# Patient Record
Sex: FEMALE | Race: WHITE | NOT HISPANIC OR LATINO
[De-identification: names, ages, dates, MRNs, and addresses within clinical notes are randomized per-mention and may not be internally consistent; named-entity substitution may affect disease eponyms.]

---

## 2021-04-28 ENCOUNTER — TRANSCRIPTION ENCOUNTER (OUTPATIENT)
Age: 46
End: 2021-04-28

## 2021-04-28 ENCOUNTER — EMERGENCY (EMERGENCY)
Facility: HOSPITAL | Age: 46
LOS: 1 days | Discharge: HOME | End: 2021-04-28
Attending: EMERGENCY MEDICINE | Admitting: EMERGENCY MEDICINE
Payer: COMMERCIAL

## 2021-04-28 VITALS
OXYGEN SATURATION: 99 % | HEART RATE: 68 BPM | RESPIRATION RATE: 216 BRPM | SYSTOLIC BLOOD PRESSURE: 139 MMHG | TEMPERATURE: 98 F | DIASTOLIC BLOOD PRESSURE: 82 MMHG | HEIGHT: 66 IN | WEIGHT: 164.02 LBS

## 2021-04-28 DIAGNOSIS — R19.00 INTRA-ABDOMINAL AND PELVIC SWELLING, MASS AND LUMP, UNSPECIFIED SITE: ICD-10-CM

## 2021-04-28 DIAGNOSIS — Z98.890 OTHER SPECIFIED POSTPROCEDURAL STATES: Chronic | ICD-10-CM

## 2021-04-28 LAB
ALBUMIN SERPL ELPH-MCNC: 4.3 G/DL — SIGNIFICANT CHANGE UP (ref 3.3–5)
ALP SERPL-CCNC: 51 U/L — SIGNIFICANT CHANGE UP (ref 40–120)
ALT FLD-CCNC: 11 U/L — SIGNIFICANT CHANGE UP (ref 10–45)
ANION GAP SERPL CALC-SCNC: 11 MMOL/L — SIGNIFICANT CHANGE UP (ref 5–17)
APPEARANCE UR: CLEAR — SIGNIFICANT CHANGE UP
APTT BLD: 25.5 SEC — LOW (ref 27.5–35.5)
AST SERPL-CCNC: 17 U/L — SIGNIFICANT CHANGE UP (ref 10–40)
BASOPHILS # BLD AUTO: 0.06 K/UL — SIGNIFICANT CHANGE UP (ref 0–0.2)
BASOPHILS NFR BLD AUTO: 0.8 % — SIGNIFICANT CHANGE UP (ref 0–2)
BILIRUB SERPL-MCNC: 0.3 MG/DL — SIGNIFICANT CHANGE UP (ref 0.2–1.2)
BILIRUB UR-MCNC: NEGATIVE — SIGNIFICANT CHANGE UP
BUN SERPL-MCNC: 21 MG/DL — SIGNIFICANT CHANGE UP (ref 7–23)
CALCIUM SERPL-MCNC: 9.1 MG/DL — SIGNIFICANT CHANGE UP (ref 8.4–10.5)
CHLORIDE SERPL-SCNC: 102 MMOL/L — SIGNIFICANT CHANGE UP (ref 96–108)
CO2 SERPL-SCNC: 26 MMOL/L — SIGNIFICANT CHANGE UP (ref 22–31)
COLOR SPEC: YELLOW — SIGNIFICANT CHANGE UP
CREAT SERPL-MCNC: 0.74 MG/DL — SIGNIFICANT CHANGE UP (ref 0.5–1.3)
DIFF PNL FLD: ABNORMAL
EOSINOPHIL # BLD AUTO: 0.32 K/UL — SIGNIFICANT CHANGE UP (ref 0–0.5)
EOSINOPHIL NFR BLD AUTO: 4.3 % — SIGNIFICANT CHANGE UP (ref 0–6)
GLUCOSE SERPL-MCNC: 99 MG/DL — SIGNIFICANT CHANGE UP (ref 70–99)
GLUCOSE UR QL: NEGATIVE — SIGNIFICANT CHANGE UP
HCG SERPL-ACNC: <0 MIU/ML — SIGNIFICANT CHANGE UP
HCT VFR BLD CALC: 38.1 % — SIGNIFICANT CHANGE UP (ref 34.5–45)
HGB BLD-MCNC: 13 G/DL — SIGNIFICANT CHANGE UP (ref 11.5–15.5)
IMM GRANULOCYTES NFR BLD AUTO: 0.5 % — SIGNIFICANT CHANGE UP (ref 0–1.5)
INR BLD: 0.92 — SIGNIFICANT CHANGE UP (ref 0.88–1.16)
KETONES UR-MCNC: NEGATIVE — SIGNIFICANT CHANGE UP
LEUKOCYTE ESTERASE UR-ACNC: ABNORMAL
LYMPHOCYTES # BLD AUTO: 2.89 K/UL — SIGNIFICANT CHANGE UP (ref 1–3.3)
LYMPHOCYTES # BLD AUTO: 38.6 % — SIGNIFICANT CHANGE UP (ref 13–44)
MCHC RBC-ENTMCNC: 31.3 PG — SIGNIFICANT CHANGE UP (ref 27–34)
MCHC RBC-ENTMCNC: 34.1 GM/DL — SIGNIFICANT CHANGE UP (ref 32–36)
MCV RBC AUTO: 91.8 FL — SIGNIFICANT CHANGE UP (ref 80–100)
MONOCYTES # BLD AUTO: 0.47 K/UL — SIGNIFICANT CHANGE UP (ref 0–0.9)
MONOCYTES NFR BLD AUTO: 6.3 % — SIGNIFICANT CHANGE UP (ref 2–14)
NEUTROPHILS # BLD AUTO: 3.71 K/UL — SIGNIFICANT CHANGE UP (ref 1.8–7.4)
NEUTROPHILS NFR BLD AUTO: 49.5 % — SIGNIFICANT CHANGE UP (ref 43–77)
NITRITE UR-MCNC: NEGATIVE — SIGNIFICANT CHANGE UP
NRBC # BLD: 0 /100 WBCS — SIGNIFICANT CHANGE UP (ref 0–0)
PH UR: 6 — SIGNIFICANT CHANGE UP (ref 5–8)
PLATELET # BLD AUTO: 307 K/UL — SIGNIFICANT CHANGE UP (ref 150–400)
POTASSIUM SERPL-MCNC: 4.2 MMOL/L — SIGNIFICANT CHANGE UP (ref 3.5–5.3)
POTASSIUM SERPL-SCNC: 4.2 MMOL/L — SIGNIFICANT CHANGE UP (ref 3.5–5.3)
PROT SERPL-MCNC: 7.3 G/DL — SIGNIFICANT CHANGE UP (ref 6–8.3)
PROT UR-MCNC: NEGATIVE MG/DL — SIGNIFICANT CHANGE UP
PROTHROM AB SERPL-ACNC: 11.1 SEC — SIGNIFICANT CHANGE UP (ref 10.6–13.6)
RBC # BLD: 4.15 M/UL — SIGNIFICANT CHANGE UP (ref 3.8–5.2)
RBC # FLD: 11.9 % — SIGNIFICANT CHANGE UP (ref 10.3–14.5)
SODIUM SERPL-SCNC: 139 MMOL/L — SIGNIFICANT CHANGE UP (ref 135–145)
SP GR SPEC: 1.02 — SIGNIFICANT CHANGE UP (ref 1–1.03)
UROBILINOGEN FLD QL: 0.2 E.U./DL — SIGNIFICANT CHANGE UP
WBC # BLD: 7.49 K/UL — SIGNIFICANT CHANGE UP (ref 3.8–10.5)
WBC # FLD AUTO: 7.49 K/UL — SIGNIFICANT CHANGE UP (ref 3.8–10.5)

## 2021-04-28 PROCEDURE — 99285 EMERGENCY DEPT VISIT HI MDM: CPT

## 2021-04-28 NOTE — ED PROVIDER NOTE - PHYSICAL EXAMINATION
GEN: Well appearing, well nourished, awake, alert, oriented to person, place, time/situation and in no apparent distress. NTAF  ENT: Airway patent, Nasal mucosa clear. Mouth with normal mucosa.  EYES: Clear bilaterally. PERRL, EOMI  RESPIRATORY: Breathing comfortably with normal RR. No W/C/R, no hypoxia or resp distress.  CARDIAC: Regular rate and rhythm, no M/R/G  ABDOMEN: Soft, nontender, +bowel sounds, no rebound, rigidity, or guarding.   MSK: Range of motion is not limited, no deformities noted.  NEURO: Alert and oriented, no focal deficits.  SKIN: Skin normal color for race, warm, dry and intact. No evidence of rash. Incision site well-healing, no cellulitis, crepitus, bleeding, drainage, gangrene or fluctuance,   PSYCH: Alert and oriented to person, place, time/situation. normal mood and affect. no apparent risk to self or others.

## 2021-04-28 NOTE — ED ADULT NURSE NOTE - OBJECTIVE STATEMENT
pt s/p abdominoplasty 3 weeks ago , c/o lower abdominal swelling x last 3 days , referred in by PCD for ultrasound , denies fever/chills /nausea/vomiting ,pt is urinating and having normal BM

## 2021-04-28 NOTE — ED ADULT NURSE NOTE - CAS EDN DISCHARGE ASSESSMENT
Pt stated she \"thought by breaking Metformin tablets in half she could get more accurate dosing that way & it would just work better.\"  Writer has advised her not to break tabs & Pt verbalized understanding.      Please advise if order should be 500 mg/day or 1000 as Pt is requesting to take only 500 mg.        Alert and oriented to person, place and time

## 2021-04-28 NOTE — ED PROVIDER NOTE - OBJECTIVE STATEMENT
45F with a h/o abdominoplasty with pubic lift (for skin removal after weight loss) by a specialist plastic surgeon in [place] 2 weeks ago who p/w lower abdominal swelling and tightness around the incision x 3 days. She spoke with her doctor in [place] who told her she might have a seroma and recommended the patient get an ultrasound so she came to the ER. The patient denies fever or redness to the area, no bleeding or drainage, no CP/SOB, no other complaints at this time. 45F with a h/o abdominoplasty with pubic lift (for skin removal after weight loss) by a specialist plastic surgeon in [place] 3 weeks ago who p/w lower abdominal swelling and tightness around the incision x 3 days. She spoke with her doctor in [place] who told her she might have a seroma and recommended the patient get an ultrasound so she came to the ER. The patient denies fever or redness to the area, no bleeding or drainage, no CP/SOB, no other complaints at this time.

## 2021-04-28 NOTE — ED PROVIDER NOTE - PROVIDER TOKENS
PROVIDER:[TOKEN:[[id number]]],PROVIDER:[TOKEN:[[id number]]],PROVIDER:[TOKEN:[[id number]]],PROVIDER:[TOKEN:[[id number]]]

## 2021-04-28 NOTE — ED PROVIDER NOTE - CARE PROVIDER_API CALL
Joel Lacy)  Plastic Surgery; Surgery of the Hand  [address], [address]  [place] [address]  Phone: [phone]  Fax: [phone]  Follow Up Time:     Pedro Navarro)  Plastic Surgery  [address], [address]  [place] [address]  Phone: [phone]  Fax: [phone]  Follow Up Time:     Vinay De Anda  PLASTIC SURGERY  [address] [place] [address]  Phone: [phone]  Fax: [phone]  Follow Up Time:     Valdez Riley)  Plastic Surgery; Surgery  [address] [place] [address]  Phone: [phone]  Fax: [phone]  Follow Up Time:

## 2021-04-28 NOTE — ED ADULT TRIAGE NOTE - CHIEF COMPLAINT QUOTE
swelling/ distended lower abdomen for 3 days. s/p abdominoplasty 3 weeks ago. Pt denies fever ,no drainage/bleeding on incision site.

## 2021-04-28 NOTE — ED PROVIDER NOTE - PATIENT PORTAL LINK FT
You can access the FollowMyHealth Patient Portal offered by [facility] by registering at the following website: [email or web address]. By joining [facility]’s FollowMyHealth portal, you will also be able to view your health information using other applications (apps) compatible with our system.

## 2021-04-28 NOTE — ED PROVIDER NOTE - NSFOLLOWUPINSTRUCTIONS_ED_ALL_ED_FT
Follow up with plastic surgeon - call this morning to schedule.  Tylenol for pain, as directed.  Return to the Emergency Department if you have any new or worsening symptoms, or if you have any concerns.  =========================    Seroma    WHAT YOU NEED TO KNOW:    A seroma is a pocket of clear fluid that develops after surgery or an injury. The fluid can collect in tissues or under the skin. Breast, neck, and abdominal surgery are the most common causes of a seroma. A drain used after surgery can also lead to a seroma if it fails or is removed too early. A major surgery or a surgery used to remove tissue increases your risk for a seroma.    DISCHARGE INSTRUCTIONS:    Seek care immediately if:   •You see pus, watery blood, or fluid coming from your surgery site.      •Your surgery wound comes open.      •You have a high fever.      •You have severe pain, redness, warmth, or swelling in the surgery site.      •Your heart is beating faster than usual.      •You have a lump near the surgery site, or the area is tender.      Contact your healthcare provider if:   •You have a mild fever that does not come down with medicine.      •You have questions or concerns about your condition or care.      Medicines: You may need any of the following:   •Antibiotics help prevent or treat a bacterial infection.      •NSAIDs, such as ibuprofen, help decrease swelling, pain, and fever. This medicine is available with or without a doctor's order. NSAIDs can cause stomach bleeding or kidney problems in certain people. If you take blood thinner medicine, always ask if NSAIDs are safe for you. Always read the medicine label and follow directions. Do not give these medicines to children under 6 months of age without direction from your child's healthcare provider.      •Take your medicine as directed. Contact your healthcare provider if you think your medicine is not helping or if you have side effects. Tell him or her if you are allergic to any medicine. Keep a list of the medicines, vitamins, and herbs you take. Include the amounts, and when and why you take them. Bring the list or the pill bottles to follow-up visits. Carry your medicine list with you in case of an emergency.      Manage a seroma:   •Check your surgery site for signs of infection. These include swelling, red skin, or pus. Infection may mean that the seroma is developing into an abscess (pocket of pus). You may need surgery to treat an abscess.      •Follow your healthcare provider's activity instructions. Your healthcare provider will tell you if it is safe for you to exercise and do your daily activities while you have a seroma. He or she will tell you which activities are right for you and how much activity to have each day. You may also need to limit certain movements if your seroma needs to be drained.      •Wear pressure devices, if directed. Pressure devices include pressure bandages and binders. Your healthcare provider will tell you which device to use and how long to wear it each day.      Follow up with your healthcare provider as directed: Write down your questions so you remember to ask them during your visits.

## 2021-04-28 NOTE — ED PROVIDER NOTE - CLINICAL SUMMARY MEDICAL DECISION MAKING FREE TEXT BOX
45F with a h/o abdominoplasty with pubic lift (for skin removal after weight loss) by a specialist plastic surgeon in [place] 2 weeks ago who p/w lower abdominal swelling and tightness around the incision x 3 days. She spoke with her doctor in [place] who told her she might have a seroma and recommended the patient get an ultrasound so she came to the ER. The patient denies fever or redness to the area, no bleeding or drainage, no CP/SOB, no other complaints at this time.  Pt well-appearing on exam with stable VS, afebrile, mild swelling to lower abd noted around incision, likely seroma vs STS, not c/w cellulitis or abscess. Plan for labs and US. Pt states if she has a seroma she will f/u with a plastic surgery specialist for drainage.   Dispo pending workup. 45F with a h/o abdominoplasty with pubic lift (for skin removal after weight loss) by a specialist plastic surgeon in [place] 3 weeks ago who p/w lower abdominal swelling and tightness around the incision x 3 days. She spoke with her doctor in [place] who told her she might have a seroma and recommended the patient get an ultrasound so she came to the ER. The patient denies fever or redness to the area, no bleeding or drainage, no CP/SOB, no other complaints at this time.  Pt well-appearing on exam with stable VS, afebrile, mild swelling to lower abd noted around incision, likely seroma vs STS, not c/w cellulitis or abscess. Plan for labs and US. Pt states if she has a seroma she will f/u with a plastic surgery specialist for drainage.   Dispo pending workup.

## 2021-04-29 ENCOUNTER — EMERGENCY (EMERGENCY)
Facility: HOSPITAL | Age: 46
LOS: 1 days | Discharge: HOME | End: 2021-04-29
Attending: EMERGENCY MEDICINE | Admitting: EMERGENCY MEDICINE
Payer: COMMERCIAL

## 2021-04-29 VITALS
DIASTOLIC BLOOD PRESSURE: 78 MMHG | TEMPERATURE: 99 F | HEIGHT: 66 IN | OXYGEN SATURATION: 98 % | HEART RATE: 80 BPM | WEIGHT: 164.02 LBS | RESPIRATION RATE: 18 BRPM | SYSTOLIC BLOOD PRESSURE: 117 MMHG

## 2021-04-29 VITALS
HEART RATE: 65 BPM | RESPIRATION RATE: 18 BRPM | SYSTOLIC BLOOD PRESSURE: 122 MMHG | OXYGEN SATURATION: 100 % | TEMPERATURE: 98 F | DIASTOLIC BLOOD PRESSURE: 75 MMHG

## 2021-04-29 DIAGNOSIS — L76.33 POSTPROCEDURAL SEROMA OF SKIN AND SUBCUTANEOUS TISSUE FOLLOWING A DERMATOLOGIC PROCEDURE: ICD-10-CM

## 2021-04-29 DIAGNOSIS — Z98.890 OTHER SPECIFIED POSTPROCEDURAL STATES: Chronic | ICD-10-CM

## 2021-04-29 LAB
GRAM STN FLD: SIGNIFICANT CHANGE UP
GRAM STN FLD: SIGNIFICANT CHANGE UP
SPECIMEN SOURCE: SIGNIFICANT CHANGE UP
SPECIMEN SOURCE: SIGNIFICANT CHANGE UP

## 2021-04-29 PROCEDURE — 81001 URINALYSIS AUTO W/SCOPE: CPT

## 2021-04-29 PROCEDURE — 85025 COMPLETE CBC W/AUTO DIFF WBC: CPT

## 2021-04-29 PROCEDURE — 76705 ECHO EXAM OF ABDOMEN: CPT

## 2021-04-29 PROCEDURE — 85610 PROTHROMBIN TIME: CPT

## 2021-04-29 PROCEDURE — 87070 CULTURE OTHR SPECIMN AEROBIC: CPT

## 2021-04-29 PROCEDURE — 99284 EMERGENCY DEPT VISIT MOD MDM: CPT | Mod: 25

## 2021-04-29 PROCEDURE — 84702 CHORIONIC GONADOTROPIN TEST: CPT

## 2021-04-29 PROCEDURE — 87086 URINE CULTURE/COLONY COUNT: CPT

## 2021-04-29 PROCEDURE — 36000 PLACE NEEDLE IN VEIN: CPT

## 2021-04-29 PROCEDURE — 76705 ECHO EXAM OF ABDOMEN: CPT | Mod: 26

## 2021-04-29 PROCEDURE — 87075 CULTR BACTERIA EXCEPT BLOOD: CPT

## 2021-04-29 PROCEDURE — 80053 COMPREHEN METABOLIC PANEL: CPT

## 2021-04-29 PROCEDURE — 10140 I&D HMTMA SEROMA/FLUID COLLJ: CPT

## 2021-04-29 PROCEDURE — 99284 EMERGENCY DEPT VISIT MOD MDM: CPT

## 2021-04-29 PROCEDURE — 85730 THROMBOPLASTIN TIME PARTIAL: CPT

## 2021-04-29 PROCEDURE — 36415 COLL VENOUS BLD VENIPUNCTURE: CPT

## 2021-04-29 RX ORDER — CEPHALEXIN 500 MG
1 CAPSULE ORAL
Qty: 4 | Refills: 0
Start: 2021-04-29 | End: 2021-05-05

## 2021-04-29 RX ORDER — ACETAMINOPHEN 500 MG
1000 TABLET ORAL ONCE
Refills: 0 | Status: COMPLETED | OUTPATIENT
Start: 2021-04-29 | End: 2021-04-29

## 2021-04-29 RX ADMIN — Medication 1000 MILLIGRAM(S): at 02:24

## 2021-04-29 NOTE — ED ADULT NURSE NOTE - OBJECTIVE STATEMENT
pt received into proc room A&Ox3 ambulatory appears comfortable arrives for seroma after surgery md nassar arrives to meet pt

## 2021-04-29 NOTE — ED PROVIDER NOTE - CLINICAL SUMMARY MEDICAL DECISION MAKING FREE TEXT BOX
46 yo female s/p recent abdominoplasty with pubic lift (for skin removal after weight loss) present to ER to see a plastic surgeon.  Pt was diagnosed with post-surgical seroma few days ago. Pt denies any fever, chills, n/v, diarrhea, constipation, or signs of infection at the incision site. Seroma was drained by  while in the ER. pt will f/u  in a few days for a wound check.

## 2021-04-29 NOTE — ED PROVIDER NOTE - NSFOLLOWUPINSTRUCTIONS_ED_ALL_ED_FT
SEROMA - Discharge Care           Seroma    WHAT YOU NEED TO KNOW:    A seroma is a pocket of clear fluid that develops after surgery or an injury. The fluid can collect in tissues or under the skin. Breast, neck, and abdominal surgery are the most common causes of a seroma. A drain used after surgery can also lead to a seroma if it fails or is removed too early. A major surgery or a surgery used to remove tissue increases your risk for a seroma.    DISCHARGE INSTRUCTIONS:    Seek care immediately if:   •You see pus, watery blood, or fluid coming from your surgery site.      •Your surgery wound comes open.      •You have a high fever.      •You have severe pain, redness, warmth, or swelling in the surgery site.      •Your heart is beating faster than usual.      •You have a lump near the surgery site, or the area is tender.      Contact your healthcare provider if:   •You have a mild fever that does not come down with medicine.      •You have questions or concerns about your condition or care.      Medicines: You may need any of the following:   •Antibiotics help prevent or treat a bacterial infection.      •NSAIDs, such as ibuprofen, help decrease swelling, pain, and fever. This medicine is available with or without a doctor's order. NSAIDs can cause stomach bleeding or kidney problems in certain people. If you take blood thinner medicine, always ask if NSAIDs are safe for you. Always read the medicine label and follow directions. Do not give these medicines to children under 6 months of age without direction from your child's healthcare provider.      •Take your medicine as directed. Contact your healthcare provider if you think your medicine is not helping or if you have side effects. Tell him or her if you are allergic to any medicine. Keep a list of the medicines, vitamins, and herbs you take. Include the amounts, and when and why you take them. Bring the list or the pill bottles to follow-up visits. Carry your medicine list with you in case of an emergency.      Manage a seroma:   •Check your surgery site for signs of infection. These include swelling, red skin, or pus. Infection may mean that the seroma is developing into an abscess (pocket of pus). You may need surgery to treat an abscess.      •Follow your healthcare provider's activity instructions. Your healthcare provider will tell you if it is safe for you to exercise and do your daily activities while you have a seroma. He or she will tell you which activities are right for you and how much activity to have each day. You may also need to limit certain movements if your seroma needs to be drained.      •Wear pressure devices, if directed. Pressure devices include pressure bandages and binders. Your healthcare provider will tell you which device to use and how long to wear it each day.      Follow up with your healthcare provider as directed: Write down your questions so you remember to ask them during your visits.

## 2021-04-29 NOTE — ED PROVIDER NOTE - PATIENT PORTAL LINK FT
You can access the FollowMyHealth Patient Portal offered by [facility] by registering at the following website: [email or web address]. By joining [employer] [facility]’s FollowMyHealth portal, you will also be able to view your health information using other applications (apps) compatible with our system.

## 2021-04-29 NOTE — ED ADULT NURSE NOTE - NS_SISCREENINGSR_GEN_ALL_ED
03/10/21 0900   RN Monitoring of Pain, Safety, and Relapse   Safety Concerns Suicidal ideation   Types of Safety Concerns 10/10 SI   Physical Concerns none   Pain Concerns none   Use of Street Drugs or Alcohol No   Taking Medications as Prescribed Yes   Patient Response Appropriate feedback;Attentive;Good eye contact;Interactive;Verbal   Nursing Comments Admission. See progress note.          This visit was performed via live interactive two-way video.    Clinician Location: [facility]   Patient Location: Home      Patient arriving to virtual [facility] program for admission. Patient has been oriented to the program, provided handouts via e-mail, and provided an explanation of expectations for PHP. Writer explained need for abstinence from mood/mind altering substances while in PHP. Patient verbalized understanding and agreement with the above information. Patient has been educated about the safety measures and effects of heat while on medications. Provided with mental health resources information. Patient verbalizes understanding of this information.    Patient affect is flat. Patient stated she is at program for \"anxiety, depression, had ADHD since young, was on Adderall in past, wasn't eating and anxiety worse.\" Patient stated medical conditions are none, see chart. Patient denies any pain at this time. Rated pain a 0, with a goal of 0. Patient stated sleep is, \"spring wacky, wake in middle of night now, wake super early, usually don't do that.\" Stated averages 7 hours/night. Patient reports difficulty falling asleep. Patient reported difficulty with staying asleep. Provided with sleep hygiene handout in admission e-mail. Discussed guided videos for relaxation/sleep. Patient stated appetite is \"not good, don't feel hungry, nothing sounds good.\" Stated she eats one meal/day and \"not usually\" snacks. Encouraged healthy eating, option on meal replacement shakes for vitamins/nutrients. Stated \"have had  some of those.\" Patient stated she drinks enough water daily. Recommended to drink at least 64 ounces daily. Denies JOSE, UHO, AVH. Stated JOSE, \"no,\" not in past either. Stated SI, \"sometimes, about once a day, lasts sometimes all day.\" Stated plan, \"sometimes.\" Patient declined to share more. Stated intent, \"sometimes.\" Patient declined to share more. Denies current specific plan or intent to act on thoughts. Denies any previous SA's. Stated protective factors are \"Family, people around me.\" Reviewed safety plan with patient. Patient agrees to seek out staff if thoughts become unmanageable and prior to acting on impulse to harm self or others. Encouraged to call someone, 911, or go to nearest ER, if unable to control thoughts. Stated [facility] is open 24/7 for assessment for inpatient treatment. Patient verbalized understanding. Stated concentration and focus are \"not good.\" Stated memory is, \"not the best.\" Stated support system is \"mom, whole family, 3 close friends.\" Stated she is medication compliant, \"not prescribed any, only hydroxyzine, don't really take, only have it for panic attacks.\" Allergies reviewed/updated in Epic. Denied current alcohol/drug use. Stated alcohol use, \"was for a long time, trying to escape feeling. Drank quite a bit, always drank.\" Stated last drank 01/01/2021. Stated drug use, \"was smoking pot, since end of sophomore year till February, everyday.\" Stated last use in 02/2021. Denies any questions or concerns at this time. Patient pleasant and verbal but vague. Support provided during admission, and to be continued with treatment.   Vitals deferred due to Virtual PHP platform, for Covid-19 accommodations.           Nursing Suicide Assessment Note - PHP    Current assessment:  See above    Current C-SSRS score: High Risk     Protective Factors / Reason for Living: Social supports(\"Family, people around me\")    Interventions:    -   Maintain current plan of care    -   Safety plan reviewed       -- Patient agrees to seek out staff if thoughts become unmanageable and prior to acting on impulse to harm self or others. Encouraged to call someone, 911, or go to nearest ER, if unable to control thoughts. Stated [facility] is open 24/7 for assessment for inpatient treatment.     Negative

## 2021-04-29 NOTE — ED PROVIDER NOTE - OBJECTIVE STATEMENT
45F with a h/o abdominoplasty with pubic lift (for skin removal after weight loss) by a specialist plastic surgeon in [place] 3 weeks ago recently diagnosed with post surgical seroma. The patient denies fever or redness to the area, no bleeding or drainage, no CP/SOB, no other complaints at this time. pt is in the ER to see Dr. Navarro for seroma drainage.

## 2021-04-30 LAB
CULTURE RESULTS: SIGNIFICANT CHANGE UP
SPECIMEN SOURCE: SIGNIFICANT CHANGE UP

## 2021-05-05 LAB
CULTURE RESULTS: NO GROWTH — SIGNIFICANT CHANGE UP
CULTURE RESULTS: NO GROWTH — SIGNIFICANT CHANGE UP
SPECIMEN SOURCE: SIGNIFICANT CHANGE UP
SPECIMEN SOURCE: SIGNIFICANT CHANGE UP

## 2021-07-05 NOTE — ED ADULT TRIAGE NOTE - TEMPERATURE IN CELSIUS (DEGREES C)
SHIFT SUMMARY:
 
NO ACUTE CHANGES TODAY. PT CONTINUES A&O, TRANSITIONED TO RA AND MAINTAINING
O2 SATS >92%, SR ON MONITOR. PT CONTINUES INCONTINENT OF LOOSE STOOLS, BUT
BUT AMOUNT OF OUTPUT IS DECREASING. PT EVAL COMPLETED TODAY, PT ABLE TO GET UP
OOB USING FWW. PT TOLERATING RENAL DIET WELL, TAKES MEDICATIONS WITHOUT
DIFFICULTY.
PT STATUS CHANGED TO MEDICAL, HAS RECEIVED ROOM ASSIGNMENT, AWAITING RECEIVING
RN FOR REPORT. 36.9

## 2023-03-27 ENCOUNTER — NON-APPOINTMENT (OUTPATIENT)
Age: 48
End: 2023-03-27

## 2023-03-27 PROBLEM — Z00.00 ENCOUNTER FOR PREVENTIVE HEALTH EXAMINATION: Status: ACTIVE | Noted: 2023-03-27

## 2023-03-28 ENCOUNTER — APPOINTMENT (OUTPATIENT)
Dept: ORTHOPEDIC SURGERY | Facility: CLINIC | Age: 48
End: 2023-03-28
Payer: COMMERCIAL

## 2023-03-28 ENCOUNTER — NON-APPOINTMENT (OUTPATIENT)
Age: 48
End: 2023-03-28

## 2023-03-28 VITALS — BODY MASS INDEX: 29.66 KG/M2 | WEIGHT: 189 LBS | HEIGHT: 67 IN

## 2023-03-28 DIAGNOSIS — M23.91 UNSPECIFIED INTERNAL DERANGEMENT OF RIGHT KNEE: ICD-10-CM

## 2023-03-28 DIAGNOSIS — S80.01XA CONTUSION OF RIGHT KNEE, INITIAL ENCOUNTER: ICD-10-CM

## 2023-03-28 PROCEDURE — 99203 OFFICE O/P NEW LOW 30 MIN: CPT

## 2023-03-28 PROCEDURE — 73562 X-RAY EXAM OF KNEE 3: CPT | Mod: RT

## 2023-04-03 NOTE — HISTORY OF PRESENT ILLNESS
[de-identified] : location: right knee \par duration: 11 days\par context: intermittent; tripped and fell onto knee\par quality: dull pain and stiffness\par aggravating factors: twisting knee inward; flexion \par associated sx: initial swelling and bruising\par conservative tx: ice, rest, ibuprofen\par prior studies: N/A

## 2023-04-03 NOTE — PHYSICAL EXAM
[de-identified] : RIGHT knee\par \par Constitutional: \par The patient is healthy-appearing and in no apparent distress. \par \par Gait:\par The patient ambulates with a normal gait and no limp.\par \par Cardiovascular System: \par The capillary refill is less than 2 seconds. \par \par Skin: \par There are no skin abnormalities.\par \par RIGHT Knee:\par  \par Bony Palpation: \par There is no tenderness of the medial joint line. \par There is no tenderness of the lateral joint line.\par There is no tenderness of the medial femoral chondyle.\par There is no tenderness of the lateral femoral chondyle.\par There is no tenderness of the tibial tubercle.\par There is no tenderness of the superior patella.\par There is no tenderness of the inferior patella.\par There is tenderness of the medial patellar facet.\par There is tenderness of the lateral patellar facet.\par \par Soft Tissue Palpation: \par There is no tenderness of the medial retinaculum.\par There is no tenderness of the lateral retinaculum.\par There is no tenderness of the quadriceps tendon.\par There is no tenderness of the patella tendon.\par There is no tenderness of the ITB.\par There is no tenderness of the pes anserine.\par \par Active Range of Motion: \par The range of motion at the knee actively and passively is full. \par \par Special Tests: \par There is a negative Apley.\par There is a negative Steinmanns. \par There is a POSITIVE Lachman and Anterior Drawer.\par There is a negative Posterior Drawer.  \par There is no varus or valgus laxity.\par \par Strength: \par There is 5/5 hip flexion and 5/5 knee flexion and extension.  \par \par Psychiatric: \par The patient demonstrates a normal mood and affect and is active and alert\par \par

## 2023-04-24 ENCOUNTER — APPOINTMENT (OUTPATIENT)
Dept: ORTHOPEDIC SURGERY | Facility: CLINIC | Age: 48
End: 2023-04-24
Payer: COMMERCIAL

## 2023-04-24 PROCEDURE — 99213 OFFICE O/P EST LOW 20 MIN: CPT

## 2023-04-25 NOTE — PHYSICAL EXAM
[de-identified] : RIGHT knee\par \par Constitutional: \par The patient is healthy-appearing and in no apparent distress. \par \par Gait:\par The patient ambulates with a normal gait and no limp.\par \par Cardiovascular System: \par The capillary refill is less than 2 seconds. \par \par Skin: \par There are no skin abnormalities.\par \par RIGHT Knee:\par  \par Bony Palpation: \par There is no tenderness of the medial joint line. \par There is no tenderness of the lateral joint line.\par There is no tenderness of the medial femoral chondyle.\par There is no tenderness of the lateral femoral chondyle.\par There is no tenderness of the tibial tubercle.\par There is no tenderness of the superior patella.\par There is no tenderness of the inferior patella.\par There is tenderness of the medial patellar facet.\par There is tenderness of the lateral patellar facet.\par \par Soft Tissue Palpation: \par There is no tenderness of the medial retinaculum.\par There is no tenderness of the lateral retinaculum.\par There is no tenderness of the quadriceps tendon.\par There is no tenderness of the patella tendon.\par There is no tenderness of the ITB.\par There is no tenderness of the pes anserine.\par \par Active Range of Motion: \par The range of motion at the knee actively and passively is full. \par \par Special Tests: \par There is a negative Apley.\par There is a negative Steinmanns. \par There is a negative Lachman and Anterior Drawer.\par There is a 3+  Posterior Drawer.  \par There is no varus or valgus laxity.\par \par Strength: \par There is 5/5 hip flexion and 5/5 knee flexion and extension.  \par \par Psychiatric: \par The patient demonstrates a normal mood and affect and is active and alert\par \par  [de-identified] : MRI right knee ([facility]):  There is a grade 3 complete tear the PCL as well as mild-to-moderate patellofemoral arthritis

## 2023-04-25 NOTE — HISTORY OF PRESENT ILLNESS
[de-identified] : Patient is an established patient presented for followup evaluation in regards to her right knee discussion regarding her MRI.  She states persistent discomfort with intermittent instability sensation

## 2023-04-25 NOTE — ASSESSMENT
[FreeTextEntry1] : Discussed with patient at length symptoms and diagnosis.  Lengthy discussion with patient regarding nonoperative and operative risks and benefits as well as surgical expectations and postop protocols and expectations, including the possibility that surgery may fail to satisfactorily resolve patient's condition / symptoms.  Patient expresses understanding and patient's questions were answered.  Patient elects to proceed with surgery.\par

## 2023-04-26 ENCOUNTER — TRANSCRIPTION ENCOUNTER (OUTPATIENT)
Age: 48
End: 2023-04-26

## 2023-05-30 RX ORDER — OXYCODONE AND ACETAMINOPHEN 5; 325 MG/1; MG/1
5-325 TABLET ORAL
Qty: 40 | Refills: 0 | Status: ACTIVE | COMMUNITY
Start: 2023-05-30 | End: 1900-01-01

## 2023-06-02 ENCOUNTER — APPOINTMENT (OUTPATIENT)
Dept: ORTHOPEDIC SURGERY | Facility: HOSPITAL | Age: 48
End: 2023-06-02
Payer: COMMERCIAL

## 2023-06-02 PROCEDURE — 29889 ARTHRS AID PCL RPR/AGMNTJ: CPT | Mod: RT

## 2023-06-02 PROCEDURE — 29875 ARTHRS KNEE SURG SYNVCT LMTD: CPT | Mod: RT,59

## 2023-06-06 ENCOUNTER — APPOINTMENT (OUTPATIENT)
Dept: ORTHOPEDIC SURGERY | Facility: CLINIC | Age: 48
End: 2023-06-06
Payer: COMMERCIAL

## 2023-06-06 PROCEDURE — 99024 POSTOP FOLLOW-UP VISIT: CPT

## 2023-06-06 NOTE — HISTORY OF PRESENT ILLNESS
[de-identified] : Physical Therapy - Ortho Referral Outpatient  s/p RIGHT knee arthroscopy with PCL\par reconstruction (allograft), PFC, funez syn [de-identified] : Patient is 4 days postop and states overall she is doing rather well and taking daily aspirin and denies any paresthesias or ankle weakness. [de-identified] : On exam of the right knee postop brace is intact with wounds clean dry and intact with sutures.  There is a moderate effusion consistent with expectation.  Negative Kaushik.  5 out of 5 ankle plantarflexion and dorsiflexion with normal sensation light touch throughout the entire knee leg.  Range of motion 0-45 with pain at end flexion [de-identified] :  s/p RIGHT knee arthroscopy with PCL\par reconstruction (allograft), PFC, funez syn [de-identified] : Discussed at length with patient.  Exam procedure and expectations and she is to follow-up in 1 week for suture removal and to begin physical therapy with weightbearing as tolerated in the brace in extension

## 2023-06-13 ENCOUNTER — APPOINTMENT (OUTPATIENT)
Dept: ORTHOPEDIC SURGERY | Facility: CLINIC | Age: 48
End: 2023-06-13
Payer: COMMERCIAL

## 2023-06-13 PROCEDURE — 99024 POSTOP FOLLOW-UP VISIT: CPT

## 2023-06-13 NOTE — HISTORY OF PRESENT ILLNESS
[de-identified] : Physical Therapy - Ortho Referral Outpatient  s/p RIGHT knee arthroscopy with PCL\par reconstruction (allograft), PFC, funez syn [de-identified] : Patient is 12 days postop and states overall she is doing rather well and taking daily aspirin and denies any paresthesias or ankle weakness. [de-identified] : On exam of the right knee postop brace is intact with wounds clean dry and intact with sutures- removed.  There is a mild effusion consistent with expectation.  Negative Kaushik.  5 out of 5 ankle plantarflexion and dorsiflexion with normal sensation light touch throughout the entire knee leg.  Range of motion 0-60 with pain at end flexion [de-identified] :  s/p RIGHT knee arthroscopy with PCL\par reconstruction (allograft), PFC, funez syn [de-identified] : Discussed at length with patient.  Patient to discontinue brace in 3 days and to begin physical therapy next week with range of motion and quad hip flexion strengthening.  Activity restrictions discussed and she is to follow-up in 3 weeks

## 2023-06-27 ENCOUNTER — APPOINTMENT (OUTPATIENT)
Dept: ORTHOPEDIC SURGERY | Facility: CLINIC | Age: 48
End: 2023-06-27
Payer: COMMERCIAL

## 2023-06-27 PROCEDURE — 99024 POSTOP FOLLOW-UP VISIT: CPT

## 2023-06-27 NOTE — HISTORY OF PRESENT ILLNESS
[de-identified] : Physical Therapy - Ortho Referral Outpatient  s/p RIGHT knee arthroscopy with PCL\par reconstruction (allograft), PFC, funez syn [de-identified] : Patient is 4 weeks postop and states overall she is doing rather well and PT going well. [de-identified] : On exam of the right knee: wounds are healed.  There is a mild effusion consistent with expectation.  Negative Kaushik.  5 out of 5 ankle plantarflexion and dorsiflexion with normal sensation light touch throughout the entire knee leg.  Range of motion 0- 95 with pain at end flexion.  Negative Posterior Drawer  [de-identified] :  s/p RIGHT knee arthroscopy with PCL\par reconstruction (allograft), PFC, funez syn [de-identified] : Discussed at length with patient.  Patient to advance physical therapy and activity restrictions discussed and she is to follow-up in 1 month

## 2023-07-25 ENCOUNTER — APPOINTMENT (OUTPATIENT)
Dept: ORTHOPEDIC SURGERY | Facility: CLINIC | Age: 48
End: 2023-07-25
Payer: COMMERCIAL

## 2023-07-25 PROCEDURE — 99024 POSTOP FOLLOW-UP VISIT: CPT

## 2023-07-25 NOTE — HISTORY OF PRESENT ILLNESS
[de-identified] : Physical Therapy - Ortho Referral Outpatient  s/p RIGHT knee arthroscopy with PCL\par reconstruction (allograft), PFC, funez syn [de-identified] : Patient is 8 weeks postop and states overall she is doing rather well and PT going well.  States no need for pain medication. [de-identified] : On exam of the right knee:  There is a minimal effusion consistent with expectation. 5 out of 5 ankle plantarflexion and dorsiflexion with normal sensation light touch throughout the entire knee leg.  Range of motion 0- 125 with minimal pain at end flexion.  Negative Posterior Drawer  [de-identified] :  s/p RIGHT knee arthroscopy with PCL\par reconstruction (allograft), PFC, funez syn [de-identified] : Discussed at length with patient.  Patient to advance physical therapy and activity restrictions discussed and she is to follow-up in 2 months

## 2023-10-03 ENCOUNTER — APPOINTMENT (OUTPATIENT)
Dept: ORTHOPEDIC SURGERY | Facility: CLINIC | Age: 48
End: 2023-10-03
Payer: COMMERCIAL

## 2023-10-03 PROCEDURE — 99213 OFFICE O/P EST LOW 20 MIN: CPT

## 2023-12-05 ENCOUNTER — APPOINTMENT (OUTPATIENT)
Dept: ORTHOPEDIC SURGERY | Facility: CLINIC | Age: 48
End: 2023-12-05

## 2023-12-06 ENCOUNTER — APPOINTMENT (OUTPATIENT)
Dept: ORTHOPEDIC SURGERY | Facility: CLINIC | Age: 48
End: 2023-12-06
Payer: COMMERCIAL

## 2023-12-06 VITALS — BODY MASS INDEX: 28.31 KG/M2 | WEIGHT: 180.4 LBS | HEIGHT: 67 IN

## 2023-12-06 DIAGNOSIS — S83.521A SPRAIN OF POSTERIOR CRUCIATE LIGAMENT OF RIGHT KNEE, INITIAL ENCOUNTER: ICD-10-CM

## 2023-12-06 PROCEDURE — 99213 OFFICE O/P EST LOW 20 MIN: CPT

## 2025-03-03 NOTE — HISTORY OF PRESENT ILLNESS
Do not wear your contacts until at least 1 week, if all symptoms have completely resolved.  The ophthalmology office will contact you today to schedule a follow-up this week.  You can always return if symptoms worsen.   [de-identified] : Physical Therapy - Ortho Referral Outpatient  s/p RIGHT knee arthroscopy with PCL\par reconstruction (allograft), PFC, funez syn [de-identified] : Patient is 4 days postop and states overall she is doing rather well and taking daily aspirin and denies any paresthesias or ankle weakness. [de-identified] : On exam of the right knee postop brace is intact with wounds clean dry and intact with sutures.  There is a moderate effusion consistent with expectation.  Negative Kaushik.  5 out of 5 ankle plantarflexion and dorsiflexion with normal sensation light touch throughout the entire knee leg.  Range of motion 0-45 with pain at end flexion [de-identified] :  s/p RIGHT knee arthroscopy with PCL\par reconstruction (allograft), PFC, funez syn [de-identified] : Discussed at length with patient.  Exam procedure and expectations and she is to follow-up in 1 week for suture removal and to begin physical therapy with weightbearing as tolerated in the brace in extension
